# Patient Record
Sex: FEMALE | Race: WHITE | URBAN - NONMETROPOLITAN AREA
[De-identification: names, ages, dates, MRNs, and addresses within clinical notes are randomized per-mention and may not be internally consistent; named-entity substitution may affect disease eponyms.]

---

## 2023-10-31 ENCOUNTER — APPOINTMENT (RX ONLY)
Dept: URBAN - NONMETROPOLITAN AREA CLINIC 30 | Facility: CLINIC | Age: 73
Setting detail: DERMATOLOGY
End: 2023-10-31

## 2023-10-31 VITALS — WEIGHT: 202 LBS

## 2023-10-31 DIAGNOSIS — D18.0 HEMANGIOMA: ICD-10-CM

## 2023-10-31 DIAGNOSIS — L82.1 OTHER SEBORRHEIC KERATOSIS: ICD-10-CM

## 2023-10-31 PROBLEM — D18.01 HEMANGIOMA OF SKIN AND SUBCUTANEOUS TISSUE: Status: ACTIVE | Noted: 2023-10-31

## 2023-10-31 PROCEDURE — 99203 OFFICE O/P NEW LOW 30 MIN: CPT

## 2023-10-31 PROCEDURE — ? DEFER

## 2023-10-31 PROCEDURE — ? COUNSELING

## 2023-10-31 PROCEDURE — ? TREATMENT REGIMEN

## 2023-10-31 ASSESSMENT — LOCATION ZONE DERM
LOCATION ZONE: TRUNK
LOCATION ZONE: LEG
LOCATION ZONE: FACE

## 2023-10-31 ASSESSMENT — LOCATION DETAILED DESCRIPTION DERM
LOCATION DETAILED: LEFT MEDIAL BUTTOCK
LOCATION DETAILED: LEFT LATERAL ZYGOMA
LOCATION DETAILED: RIGHT LATERAL TEMPLE
LOCATION DETAILED: LEFT ANTERIOR LATERAL PROXIMAL THIGH
LOCATION DETAILED: INFERIOR THORACIC SPINE
LOCATION DETAILED: LEFT SUPERIOR MEDIAL UPPER BACK

## 2023-10-31 ASSESSMENT — LOCATION SIMPLE DESCRIPTION DERM
LOCATION SIMPLE: LEFT THIGH
LOCATION SIMPLE: LEFT BUTTOCK
LOCATION SIMPLE: LEFT ZYGOMA
LOCATION SIMPLE: RIGHT TEMPLE
LOCATION SIMPLE: LEFT UPPER BACK
LOCATION SIMPLE: UPPER BACK

## 2023-10-31 NOTE — PROCEDURE: DEFER
Detail Level: Simple
X Size Of Lesion In Cm (Optional): 0
Introduction Text (Please End With A Colon): The following procedure was deferred: LN2 and biopsy

## 2023-10-31 NOTE — PROCEDURE: MIPS QUALITY
Quality 110: Preventive Care And Screening: Influenza Immunization: Influenza Immunization Administered during Influenza season
Quality 226: Preventive Care And Screening: Tobacco Use: Screening And Cessation Intervention: Patient screened for tobacco use and is an ex/non-smoker
Quality 111:Pneumonia Vaccination Status For Older Adults: Patient did not receive any pneumococcal conjugate or polysaccharide vaccine on or after their 60th birthday and before the end of the measurement period
Detail Level: Detailed
Quality 431: Preventive Care And Screening: Unhealthy Alcohol Use - Screening: Patient not identified as an unhealthy alcohol user when screened for unhealthy alcohol use using a systematic screening method

## 2023-10-31 NOTE — PROCEDURE: TREATMENT REGIMEN
Plan: She would like these areas treated with LN2 but is going on a trip. She will come back in once she returns. The one on her buttocks will need a biopsy.
Detail Level: Simple